# Patient Record
(demographics unavailable — no encounter records)

---

## 2017-12-13 NOTE — REPUSA
CLINICAL HISTORY:  Vaginal bleeding status post .

 

TECHNIQUE:  Realtime sonographic images were obtained in multiple projections.

 

COMMENTS:

The uterus is anteverted measuring 8.3 x 4 x 4.9 cm.  The endometrial echo pattern is within thickene
d and irregular with increased blood flow measuring 13.2 mm.

 

There is no evidence of free fluid within the pelvic cul-de-sac. 

 

The right ovary measures 3.6 x 2.8 x 2.7 cm and the left ovary measures 3.5 x 2.8 x 2.9 cm.  Both ova
heather are free of solid or cystic mass. 

 

The right ovarian artery resistive index measures 0.63.  The left ovarian artery resistive index akhil
ures 0.5.

 

There is no evidence for abnormal vascularity.

 

IMPRESSION:

 

The endometrium is thickened and irregular with increased blood flow likely due to retained product o
f conception.

 

     Electronically signed by HERVE BRAR MD on 2017 11:19:46 PM ET